# Patient Record
Sex: FEMALE | Race: BLACK OR AFRICAN AMERICAN
[De-identification: names, ages, dates, MRNs, and addresses within clinical notes are randomized per-mention and may not be internally consistent; named-entity substitution may affect disease eponyms.]

---

## 2017-08-05 ENCOUNTER — HOSPITAL ENCOUNTER (OUTPATIENT)
Dept: HOSPITAL 23 - CMRI | Age: 67
Discharge: HOME | End: 2017-08-05
Attending: PSYCHIATRY & NEUROLOGY
Payer: COMMERCIAL

## 2017-08-05 DIAGNOSIS — R41.3: Primary | ICD-10-CM

## 2017-08-05 LAB
POC - CREATININE: 0.68 MG/DL (ref 0.44–1.03)
POC - GFR: >60 ML/MIN (ref 60–?)

## 2017-08-05 PROCEDURE — A9577 INJ MULTIHANCE: HCPCS

## 2024-11-13 ENCOUNTER — TRANSCRIBE ORDERS (OUTPATIENT)
Dept: DIABETES SERVICES | Facility: HOSPITAL | Age: 74
End: 2024-11-13

## 2024-11-13 DIAGNOSIS — E11.9 DIABETES MELLITUS WITHOUT COMPLICATION: Primary | ICD-10-CM

## 2024-11-20 ENCOUNTER — HOSPITAL ENCOUNTER (OUTPATIENT)
Dept: DIABETES SERVICES | Facility: HOSPITAL | Age: 74
Discharge: HOME | End: 2024-11-20
Payer: MEDICARE

## 2024-11-20 PROCEDURE — 97802 MEDICAL NUTRITION INDIV IN: CPT

## 2024-11-20 NOTE — CONSULTS
Laurie met with TAMARA TOMAS for Diabetes Education.  A comprehensive assessment/training record has been sent to medical records (see media tab) to associate with this encounter.     Sees Dr. Loomis. Family history of diabetes. ADA goals reviewed.This morning 181 mg/dl then 2 hours later 252 mg/dl. Had milk, farina, butter, sprinkle of sugar for breakfast. Was sick recently with sinus infection and reaction to vaccines. LASHAWN with CPAP. On metformin, glimepiride, and farxiga. Consistent carbohydrate diet/carbohydrate counting discussed. Encouraged balancing carbs with lean proteins/limiting saturated fats.     Laurie has been encouraged to call our office with questions or additional education needs. Please place referral for additional services or follow-up should need arise.     Thank you for the referral.